# Patient Record
Sex: MALE | Race: BLACK OR AFRICAN AMERICAN | NOT HISPANIC OR LATINO | URBAN - METROPOLITAN AREA
[De-identification: names, ages, dates, MRNs, and addresses within clinical notes are randomized per-mention and may not be internally consistent; named-entity substitution may affect disease eponyms.]

---

## 2021-02-16 ENCOUNTER — EMERGENCY (EMERGENCY)
Facility: HOSPITAL | Age: 26
LOS: 1 days | Discharge: HOME | End: 2021-02-16
Attending: EMERGENCY MEDICINE | Admitting: EMERGENCY MEDICINE
Payer: OTHER MISCELLANEOUS

## 2021-02-16 VITALS
OXYGEN SATURATION: 100 % | DIASTOLIC BLOOD PRESSURE: 90 MMHG | TEMPERATURE: 98 F | RESPIRATION RATE: 18 BRPM | SYSTOLIC BLOOD PRESSURE: 140 MMHG | HEART RATE: 82 BPM

## 2021-02-16 VITALS
SYSTOLIC BLOOD PRESSURE: 120 MMHG | HEART RATE: 74 BPM | OXYGEN SATURATION: 100 % | TEMPERATURE: 98 F | DIASTOLIC BLOOD PRESSURE: 82 MMHG | RESPIRATION RATE: 19 BRPM

## 2021-02-16 PROCEDURE — 73030 X-RAY EXAM OF SHOULDER: CPT | Mod: 26,RT

## 2021-02-16 PROCEDURE — 99284 EMERGENCY DEPT VISIT MOD MDM: CPT

## 2021-02-16 RX ORDER — IBUPROFEN 200 MG
600 TABLET ORAL ONCE
Refills: 0 | Status: COMPLETED | OUTPATIENT
Start: 2021-02-16 | End: 2021-02-16

## 2021-02-16 RX ADMIN — Medication 600 MILLIGRAM(S): at 09:01

## 2021-02-16 NOTE — ED PROVIDER NOTE - OBJECTIVE STATEMENT
26 y/o M with no significant PMHx presents to ED with right shoulder pain x2 days. Pt is a  at [employer] and states the pain began while lifting luggage at work. Pain has been constant since the beginning and pt notes worsening of pain with abduction of the shoulder. Denies having any numbness, focal weakness, tingling, or other medical complaints. No relief with Tylenol taken at home. Of note patient is right hand dominant.

## 2021-02-16 NOTE — ED PROVIDER NOTE - PATIENT PORTAL LINK FT
You can access the FollowMyHealth Patient Portal offered by [facility] by registering at the following website: [email or web address]. By joining [employer]’s FollowMyHealth portal, you will also be able to view your health information using other applications (apps) compatible with our system.

## 2021-02-16 NOTE — ED PROVIDER NOTE - NSFOLLOWUPINSTRUCTIONS_ED_ALL_ED_FT
1. TAKE IBUPROFEN 600 MG EVERY 6 HOURS, IF NEEDED FOR PAIN.    2. CALL ORTHOPEDICS FOR AN APPOINTMENT IF YOU DO NOT HAVE IMPROVEMENT WITHIN 1 WEEK.  CALL THE CLINIC -[phone], OR ANY DOCTOR ON THE REFERRAL LIST YOU WERE GIVEN.    3. TRY TO KEEP MOVING YOUR SHOULDER AS MUCH AS POSSIBLE SO THAT IT DOES NOT GET STIFF.    4. RETURN TO THE ER FOR ANY WORSENING SYMPTOMS OR CONCERNS.

## 2021-02-16 NOTE — ED PROVIDER NOTE - SKIN, MLM
Skin normal color for race, warm, dry and intact. No evidence of rash. No erythema or skin changes to the shoulder.

## 2021-02-16 NOTE — ED ADULT NURSE NOTE - CHIEF COMPLAINT QUOTE
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

## 2021-02-16 NOTE — ED ADULT TRIAGE NOTE - CHIEF COMPLAINT QUOTE
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

## 2021-02-16 NOTE — ED PROVIDER NOTE - MUSCULOSKELETAL MINIMAL EXAM
R shoulder normal appearing, same size and muscle composition as left shoulder, strength 5/5 proximal and distal in both UE, TTP along right anterior and lateral shoulder

## 2021-02-16 NOTE — ED PROVIDER NOTE - CLINICAL SUMMARY MEDICAL DECISION MAKING FREE TEXT BOX
26 y/o M presents with likely muscle injury to right shoulder, consider rotator cuff injury. Unlikely fracture, however will check XR to evaluate for bone alignment or AC separation. Plan to give Ibuprofen and reevaluate. Patient declines ice in the ED.

## 2021-02-16 NOTE — ED PROVIDER NOTE - PROGRESS NOTE DETAILS
Dr. Wallis: pt stable after ibuprofen, xray showing no acute findings; will discharge with instructions to use ibuprofen at home and ortho referral list for if symptoms not improving within 1 week

## 2021-02-16 NOTE — ED ADULT NURSE NOTE - OBJECTIVE STATEMENT
Receive pt. in Intake room 10c alert and oriented x 4, presenting to the ER with complaints of right shoulder and right upper arm pain. Pt. stated " [place] everyday and I have pain fo 2 days in my right shoulder", Right arm mobile, painful with movement. Medicated as ordered, pt. waiting for x-ray.

## 2021-02-18 PROBLEM — Z00.00 ENCOUNTER FOR PREVENTIVE HEALTH EXAMINATION: Status: ACTIVE | Noted: 2021-02-18

## 2021-02-19 ENCOUNTER — APPOINTMENT (OUTPATIENT)
Dept: ORTHOPEDIC SURGERY | Facility: CLINIC | Age: 26
End: 2021-02-19
Payer: OTHER MISCELLANEOUS

## 2021-02-19 VITALS
SYSTOLIC BLOOD PRESSURE: 121 MMHG | WEIGHT: 230 LBS | BODY MASS INDEX: 31.15 KG/M2 | HEIGHT: 72 IN | HEART RATE: 89 BPM | DIASTOLIC BLOOD PRESSURE: 81 MMHG

## 2021-02-19 DIAGNOSIS — Z78.9 OTHER SPECIFIED HEALTH STATUS: ICD-10-CM

## 2021-02-19 DIAGNOSIS — S49.91XA UNSPECIFIED INJURY OF RIGHT SHOULDER AND UPPER ARM, INITIAL ENCOUNTER: ICD-10-CM

## 2021-02-19 DIAGNOSIS — Z87.09 PERSONAL HISTORY OF OTHER DISEASES OF THE RESPIRATORY SYSTEM: ICD-10-CM

## 2021-02-19 PROCEDURE — 99072 ADDL SUPL MATRL&STAF TM PHE: CPT

## 2021-02-19 PROCEDURE — 99204 OFFICE O/P NEW MOD 45 MIN: CPT

## 2021-02-19 RX ORDER — DICLOFENAC SODIUM 75 MG/1
75 TABLET, DELAYED RELEASE ORAL
Qty: 60 | Refills: 0 | Status: ACTIVE | COMMUNITY
Start: 2021-02-19 | End: 1900-01-01

## 2021-02-19 NOTE — DISCUSSION/SUMMARY
[de-identified] : The patient has tendinitis of the right shoulder.  I have discussed the pathology, natural history and treatment options with him.  He is started on a course of diclofenac.  Medication risks have been reviewed.  He needs to avoid heavy lifting.  He is unable to work.  He will be reevaluated in 2 weeks

## 2021-02-19 NOTE — HISTORY OF PRESENT ILLNESS
[de-identified] : 25 year old RHD male  for [employer] presents with right shoulder pain which began approximately 6 days ago after lifting bags. Later that night he was unable to lift his arm to brush his hair. He was evaluated in the ED where xrays were negative for fracture or dislocation. He was treated with ibuprofen. He reports that the pain is gradually improving and he is able to move his shoulder more easily. He denies history of prior shoulder problems.

## 2021-02-19 NOTE — PHYSICAL EXAM
[Rad] : radial 2+ and symmetric bilaterally [Normal] : Alert and in no acute distress [Poor Appearance] : well-appearing [Acute Distress] : not in acute distress [Obese] : not obese [de-identified] : The patient has no respiratory distress. Mood and affect are normal. The patient is alert and oriented to person, place and time.\par Examination of the cervical spine demonstrates no tenderness, no deformity and no muscle spasm. Cervical spine rotation is 60° to the right, 60° to the left, 75° of extension and 45° of flexion. Neurologic exam of the upper extremities reveals intact sensation to light touch. Motor function is 5 over 5 in all groups. Deep tendon reflexes are 2+ and equal at the biceps, triceps and brachioradialis.\par Examination of the right shoulder demonstrates no deformity. The skin is intact. There is no erythema. There is tenderness anteriorly. Impingement sign is positive There is no instability. Drop arm test is negative. Empty can test is negative. Liftoff test is negative. Dallam test is negative.  He has elevation of 140 degrees bilaterally.  He has external rotation of 45 degrees on the right and 50 degrees on the left.  He has internal rotation to the lower lumbar level on the right and upper lumbar level on the left.  The elbows are stable and nontender.  There is no lymphedema. [de-identified] : EXAM: RAD SHOULDER RT\par PROCEDURE DATE: Feb 16 2021\par INTERPRETATION: TIME OF EXAM: February 16, 2021 at 9:08 AM\par \par CLINICAL INFORMATION: Sudden pain of right shoulder.\par \par TECHNIQUE: External, internal rotation and Y scapula views of the right shoulder.\par \par INTERPRETATION:\par \par There are no fractures or dislocations. The articulating surfaces are normal. Visible right lung is clear. No soft tissue abnormalities.\par \par COMPARISON: None available\par \par IMPRESSION: Normal right shoulder.\par \par CHAVEZ MCGRATH MD; Attending Radiologist\par This document has been electronically signed. Feb 16 2021 10:38AM

## 2021-03-09 ENCOUNTER — APPOINTMENT (OUTPATIENT)
Dept: ORTHOPEDIC SURGERY | Facility: CLINIC | Age: 26
End: 2021-03-09
Payer: OTHER MISCELLANEOUS

## 2021-03-09 VITALS
HEIGHT: 72 IN | WEIGHT: 230 LBS | HEART RATE: 89 BPM | DIASTOLIC BLOOD PRESSURE: 79 MMHG | SYSTOLIC BLOOD PRESSURE: 117 MMHG | BODY MASS INDEX: 31.15 KG/M2

## 2021-03-09 DIAGNOSIS — S49.91XD UNSPECIFIED INJURY OF RIGHT SHOULDER AND UPPER ARM, SUBSEQUENT ENCOUNTER: ICD-10-CM

## 2021-03-09 PROCEDURE — 99072 ADDL SUPL MATRL&STAF TM PHE: CPT

## 2021-03-09 PROCEDURE — 99213 OFFICE O/P EST LOW 20 MIN: CPT

## 2021-03-10 ENCOUNTER — APPOINTMENT (OUTPATIENT)
Dept: ORTHOPEDIC SURGERY | Facility: CLINIC | Age: 26
End: 2021-03-10

## 2021-03-23 ENCOUNTER — RX RENEWAL (OUTPATIENT)
Age: 26
End: 2021-03-23